# Patient Record
Sex: FEMALE | Race: WHITE | HISPANIC OR LATINO | URBAN - METROPOLITAN AREA
[De-identification: names, ages, dates, MRNs, and addresses within clinical notes are randomized per-mention and may not be internally consistent; named-entity substitution may affect disease eponyms.]

---

## 2017-05-29 ENCOUNTER — EMERGENCY (EMERGENCY)
Facility: HOSPITAL | Age: 32
LOS: 1 days | Discharge: HOME | End: 2017-05-29
Attending: EMERGENCY MEDICINE
Payer: COMMERCIAL

## 2017-05-29 VITALS
HEIGHT: 61 IN | TEMPERATURE: 98 F | RESPIRATION RATE: 18 BRPM | HEART RATE: 75 BPM | OXYGEN SATURATION: 100 % | WEIGHT: 169.98 LBS | DIASTOLIC BLOOD PRESSURE: 78 MMHG | SYSTOLIC BLOOD PRESSURE: 110 MMHG

## 2017-05-29 PROCEDURE — 99283 EMERGENCY DEPT VISIT LOW MDM: CPT

## 2017-05-29 PROCEDURE — 99283 EMERGENCY DEPT VISIT LOW MDM: CPT | Mod: 25

## 2017-05-29 PROCEDURE — 73610 X-RAY EXAM OF ANKLE: CPT

## 2017-05-29 NOTE — ED ADULT TRIAGE NOTE - CHIEF COMPLAINT QUOTE
Pt "was walking on a trail on Saturday and fell on a rock and now I can't walk", no deformity noted to ankle

## 2017-05-29 NOTE — ED STATDOCS - ATTENDING CONTRIBUTION TO CARE
I personally saw the patient with the PA, and completed the key components of the history and physical exam. I then discussed the management plan with the PA I personally saw the patient with the PA, and completed the key components of the history and physical exam. I then discussed the management plan with the PA  30 y/o female presenting c/o left ankle pain s/p mechanical fall 2 days ago. Pt states she has taken Motrin w/o relief, last dose early this morning. bimalleolar ttp and swelling x-ray (-) for fx plan: splint, crutches, RICE, ortho f/u

## 2017-05-29 NOTE — ED STATDOCS - OBJECTIVE STATEMENT
32 y/o female presenting c/o left ankle pain s/p mechanical fall 2 days ago. Pt states she has taken Motrin w/o relief, last dose early this morning. 30 y/o female presenting c/o left ankle pain s/p mechanical fall 2 days ago. Pt states she has taken Motrin w/o relief, last dose early this morning. Pt states she did not seek medical tx until today because she thought it would resolve. No further complaints at this time.

## 2017-05-29 NOTE — ED STATDOCS - NS ED MD SCRIBE ATTENDING SCRIBE SECTIONS
DISPOSITION/HIV/PAST MEDICAL/SURGICAL/SOCIAL HISTORY/REVIEW OF SYSTEMS/PHYSICAL EXAM/VITAL SIGNS( Pullset)/HISTORY OF PRESENT ILLNESS

## 2017-05-30 PROCEDURE — 73610 X-RAY EXAM OF ANKLE: CPT | Mod: 26,LT

## 2017-05-30 RX ORDER — OXYCODONE HYDROCHLORIDE 5 MG/1
1 TABLET ORAL
Qty: 10 | Refills: 0 | OUTPATIENT
Start: 2017-05-30

## 2017-05-31 PROBLEM — Z00.00 ENCOUNTER FOR PREVENTIVE HEALTH EXAMINATION: Status: ACTIVE | Noted: 2017-05-31

## 2017-06-08 ENCOUNTER — APPOINTMENT (OUTPATIENT)
Dept: ORTHOPEDIC SURGERY | Facility: CLINIC | Age: 32
End: 2017-06-08

## 2018-09-06 ENCOUNTER — APPOINTMENT (OUTPATIENT)
Dept: ORTHOPEDIC SURGERY | Facility: CLINIC | Age: 33
End: 2018-09-06
Payer: COMMERCIAL

## 2018-09-06 VITALS
BODY MASS INDEX: 33.99 KG/M2 | HEIGHT: 61 IN | SYSTOLIC BLOOD PRESSURE: 114 MMHG | WEIGHT: 180 LBS | DIASTOLIC BLOOD PRESSURE: 77 MMHG | HEART RATE: 64 BPM

## 2018-09-06 DIAGNOSIS — M54.6 PAIN IN THORACIC SPINE: ICD-10-CM

## 2018-09-06 DIAGNOSIS — Z82.61 FAMILY HISTORY OF ARTHRITIS: ICD-10-CM

## 2018-09-06 DIAGNOSIS — Z80.9 FAMILY HISTORY OF MALIGNANT NEOPLASM, UNSPECIFIED: ICD-10-CM

## 2018-09-06 DIAGNOSIS — Z78.9 OTHER SPECIFIED HEALTH STATUS: ICD-10-CM

## 2018-09-06 DIAGNOSIS — M54.12 RADICULOPATHY, CERVICAL REGION: ICD-10-CM

## 2018-09-06 PROCEDURE — 72040 X-RAY EXAM NECK SPINE 2-3 VW: CPT

## 2018-09-06 PROCEDURE — 99204 OFFICE O/P NEW MOD 45 MIN: CPT

## 2018-09-19 ENCOUNTER — APPOINTMENT (OUTPATIENT)
Dept: ORTHOPEDIC SURGERY | Facility: CLINIC | Age: 33
End: 2018-09-19
Payer: COMMERCIAL

## 2018-09-19 VITALS
DIASTOLIC BLOOD PRESSURE: 79 MMHG | WEIGHT: 180 LBS | HEIGHT: 61 IN | BODY MASS INDEX: 33.99 KG/M2 | HEART RATE: 63 BPM | SYSTOLIC BLOOD PRESSURE: 118 MMHG

## 2018-09-19 DIAGNOSIS — M67.912 UNSPECIFIED DISORDER OF SYNOVIUM AND TENDON, LEFT SHOULDER: ICD-10-CM

## 2018-09-19 DIAGNOSIS — M50.90 CERVICAL DISC DISORDER, UNSPECIFIED, UNSPECIFIED CERVICAL REGION: ICD-10-CM

## 2018-09-19 PROCEDURE — 99214 OFFICE O/P EST MOD 30 MIN: CPT

## 2018-10-29 ENCOUNTER — APPOINTMENT (OUTPATIENT)
Dept: ORTHOPEDIC SURGERY | Facility: CLINIC | Age: 33
End: 2018-10-29
Payer: COMMERCIAL

## 2018-10-29 VITALS
WEIGHT: 185 LBS | DIASTOLIC BLOOD PRESSURE: 70 MMHG | BODY MASS INDEX: 34.93 KG/M2 | SYSTOLIC BLOOD PRESSURE: 107 MMHG | HEART RATE: 82 BPM | HEIGHT: 61 IN

## 2018-10-29 DIAGNOSIS — G25.89 OTHER SPECIFIED EXTRAPYRAMIDAL AND MOVEMENT DISORDERS: ICD-10-CM

## 2018-10-29 DIAGNOSIS — M75.82 OTHER SHOULDER LESIONS, LEFT SHOULDER: ICD-10-CM

## 2018-10-29 DIAGNOSIS — S29.012A STRAIN OF MUSCLE AND TENDON OF BACK WALL OF THORAX, INITIAL ENCOUNTER: ICD-10-CM

## 2018-10-29 DIAGNOSIS — Z80.9 FAMILY HISTORY OF MALIGNANT NEOPLASM, UNSPECIFIED: ICD-10-CM

## 2018-10-29 PROCEDURE — 73030 X-RAY EXAM OF SHOULDER: CPT | Mod: LT

## 2018-10-29 PROCEDURE — 99215 OFFICE O/P EST HI 40 MIN: CPT

## 2018-11-28 ENCOUNTER — FORM ENCOUNTER (OUTPATIENT)
Age: 33
End: 2018-11-28

## 2018-11-29 ENCOUNTER — APPOINTMENT (OUTPATIENT)
Dept: MRI IMAGING | Facility: CLINIC | Age: 33
End: 2018-11-29
Payer: COMMERCIAL

## 2018-11-29 ENCOUNTER — OUTPATIENT (OUTPATIENT)
Dept: OUTPATIENT SERVICES | Facility: HOSPITAL | Age: 33
LOS: 1 days | End: 2018-11-29
Payer: COMMERCIAL

## 2018-11-29 DIAGNOSIS — M67.912 UNSPECIFIED DISORDER OF SYNOVIUM AND TENDON, LEFT SHOULDER: ICD-10-CM

## 2018-11-29 PROCEDURE — 73221 MRI JOINT UPR EXTREM W/O DYE: CPT | Mod: 26,LT

## 2018-11-29 PROCEDURE — 73221 MRI JOINT UPR EXTREM W/O DYE: CPT

## 2019-02-05 ENCOUNTER — APPOINTMENT (OUTPATIENT)
Dept: ORTHOPEDIC SURGERY | Facility: CLINIC | Age: 34
End: 2019-02-05

## 2019-07-17 ENCOUNTER — LABORATORY RESULT (OUTPATIENT)
Age: 34
End: 2019-07-17

## 2019-07-17 ENCOUNTER — APPOINTMENT (OUTPATIENT)
Dept: FAMILY MEDICINE | Facility: CLINIC | Age: 34
End: 2019-07-17
Payer: COMMERCIAL

## 2019-07-17 VITALS
HEIGHT: 61 IN | BODY MASS INDEX: 34.93 KG/M2 | SYSTOLIC BLOOD PRESSURE: 118 MMHG | OXYGEN SATURATION: 98 % | DIASTOLIC BLOOD PRESSURE: 74 MMHG | WEIGHT: 185 LBS | RESPIRATION RATE: 13 BRPM | HEART RATE: 74 BPM

## 2019-07-17 DIAGNOSIS — Z86.39 PERSONAL HISTORY OF OTHER ENDOCRINE, NUTRITIONAL AND METABOLIC DISEASE: ICD-10-CM

## 2019-07-17 PROCEDURE — 36415 COLL VENOUS BLD VENIPUNCTURE: CPT

## 2019-07-17 PROCEDURE — 99203 OFFICE O/P NEW LOW 30 MIN: CPT | Mod: 25

## 2019-07-17 NOTE — HEALTH RISK ASSESSMENT
[] : No [No] : In the past 12 months have you used drugs other than those required for medical reasons? No [No falls in past year] : Patient reported no falls in the past year [0] : 2) Feeling down, depressed, or hopeless: Not at all (0) [[id number]] : 0

## 2019-07-17 NOTE — REVIEW OF SYSTEMS
[Constipation] : constipation [Hair Changes] : hair changes [Negative] : Heme/Lymph [de-identified] : mood swings

## 2019-07-17 NOTE — HISTORY OF PRESENT ILLNESS
[FreeTextEntry8] : 34 yr F ,new to us, here for Hypo thyroid TX.\par Pt states she has never been on a proper dose, meds have been changed frequently.Patient states she goes up and down in her weight, mood swings, constipation, hair loss.\par She is single,  one para one, nonsmoker, no alcohol and no drugs, works in medical billing.

## 2019-07-17 NOTE — ASSESSMENT
[FreeTextEntry1] : Hypothyroid-----patient states she is taking levothyroxine 150 mcg daily. Her dose is always being changed.\par Blood work today for TSH with T3 and T4.\par Antithyroid antibodies.\par CBC and CMP.\par \par Patient will be called with the results and followup initiated appropriatly.

## 2019-07-18 LAB
BASOPHILS # BLD AUTO: 0.04 K/UL
BASOPHILS NFR BLD AUTO: 0.5 %
EOSINOPHIL # BLD AUTO: 0.43 K/UL
EOSINOPHIL NFR BLD AUTO: 5.8 %
HCT VFR BLD CALC: 39.2 %
HGB BLD-MCNC: 11.8 G/DL
IMM GRANULOCYTES NFR BLD AUTO: 0.3 %
LYMPHOCYTES # BLD AUTO: 2.11 K/UL
LYMPHOCYTES NFR BLD AUTO: 28.2 %
MAN DIFF?: NORMAL
MCHC RBC-ENTMCNC: 24.5 PG
MCHC RBC-ENTMCNC: 30.1 GM/DL
MCV RBC AUTO: 81.5 FL
MONOCYTES # BLD AUTO: 0.71 K/UL
MONOCYTES NFR BLD AUTO: 9.5 %
NEUTROPHILS # BLD AUTO: 4.16 K/UL
NEUTROPHILS NFR BLD AUTO: 55.7 %
PLATELET # BLD AUTO: 324 K/UL
RBC # BLD: 4.81 M/UL
RBC # FLD: 16 %
T3 SERPL-MCNC: 95 NG/DL
TSH SERPL-ACNC: 0.07 UIU/ML
WBC # FLD AUTO: 7.47 K/UL

## 2019-07-19 ENCOUNTER — RX RENEWAL (OUTPATIENT)
Age: 34
End: 2019-07-19

## 2019-07-19 ENCOUNTER — RESULT REVIEW (OUTPATIENT)
Age: 34
End: 2019-07-19

## 2019-07-19 LAB
ALBUMIN SERPL ELPH-MCNC: 4.3 G/DL
ALP BLD-CCNC: 90 U/L
ALT SERPL-CCNC: 17 U/L
ANION GAP SERPL CALC-SCNC: 17 MMOL/L
AST SERPL-CCNC: 18 U/L
BILIRUB SERPL-MCNC: <0.2 MG/DL
BUN SERPL-MCNC: 12 MG/DL
CALCIUM SERPL-MCNC: 9.7 MG/DL
CHLORIDE SERPL-SCNC: 103 MMOL/L
CO2 SERPL-SCNC: 24 MMOL/L
CREAT SERPL-MCNC: 0.66 MG/DL
GLUCOSE SERPL-MCNC: 50 MG/DL
POTASSIUM SERPL-SCNC: 4.3 MMOL/L
PROT SERPL-MCNC: 7.4 G/DL
SODIUM SERPL-SCNC: 144 MMOL/L
THYROGLOB AB SERPL-ACNC: <20 IU/ML
THYROPEROXIDASE AB SERPL IA-ACNC: 75.5 IU/ML

## 2019-12-26 ENCOUNTER — APPOINTMENT (OUTPATIENT)
Dept: FAMILY MEDICINE | Facility: CLINIC | Age: 34
End: 2019-12-26

## 2020-01-09 ENCOUNTER — APPOINTMENT (OUTPATIENT)
Dept: NEUROLOGY | Facility: CLINIC | Age: 35
End: 2020-01-09

## 2020-03-03 ENCOUNTER — APPOINTMENT (OUTPATIENT)
Dept: NEUROLOGY | Facility: CLINIC | Age: 35
End: 2020-03-03

## 2021-10-21 ENCOUNTER — APPOINTMENT (OUTPATIENT)
Dept: UROLOGY | Facility: CLINIC | Age: 36
End: 2021-10-21
Payer: COMMERCIAL

## 2021-10-21 ENCOUNTER — NON-APPOINTMENT (OUTPATIENT)
Age: 36
End: 2021-10-21

## 2021-10-21 VITALS
BODY MASS INDEX: 35.87 KG/M2 | HEART RATE: 85 BPM | WEIGHT: 190 LBS | HEIGHT: 61 IN | TEMPERATURE: 98 F | SYSTOLIC BLOOD PRESSURE: 94 MMHG | DIASTOLIC BLOOD PRESSURE: 61 MMHG

## 2021-10-21 DIAGNOSIS — N28.1 CYST OF KIDNEY, ACQUIRED: ICD-10-CM

## 2021-10-21 DIAGNOSIS — N32.81 OVERACTIVE BLADDER: ICD-10-CM

## 2021-10-21 LAB
BILIRUB UR QL STRIP: NEGATIVE
CLARITY UR: CLEAR
COLLECTION METHOD: NORMAL
GLUCOSE UR-MCNC: NEGATIVE
HCG UR QL: 0.2 EU/DL
HGB UR QL STRIP.AUTO: NEGATIVE
KETONES UR-MCNC: NEGATIVE
LEUKOCYTE ESTERASE UR QL STRIP: NEGATIVE
NITRITE UR QL STRIP: NEGATIVE
PH UR STRIP: 6
PROT UR STRIP-MCNC: NEGATIVE
SP GR UR STRIP: 1.02

## 2021-10-21 PROCEDURE — 51798 US URINE CAPACITY MEASURE: CPT

## 2021-10-21 PROCEDURE — 99204 OFFICE O/P NEW MOD 45 MIN: CPT

## 2021-10-21 RX ORDER — LEVOTHYROXINE SODIUM 137 UG/1
137 TABLET ORAL
Qty: 30 | Refills: 2 | Status: COMPLETED | COMMUNITY
End: 2021-10-21

## 2021-10-21 RX ORDER — OXYBUTYNIN CHLORIDE 5 MG/1
5 TABLET, EXTENDED RELEASE ORAL
Qty: 30 | Refills: 1 | Status: ACTIVE | COMMUNITY
Start: 2021-10-21 | End: 1900-01-01

## 2021-10-21 RX ORDER — NORETHINDRONE 0.35 MG/1
TABLET ORAL
Refills: 0 | Status: ACTIVE | COMMUNITY

## 2021-10-21 RX ORDER — METHYLPREDNISOLONE 4 MG/1
4 TABLET ORAL
Qty: 1 | Refills: 0 | Status: COMPLETED | COMMUNITY
Start: 2018-10-29 | End: 2021-10-21

## 2021-10-21 RX ORDER — LEVOTHYROXINE SODIUM 112 UG/1
112 TABLET ORAL
Refills: 0 | Status: ACTIVE | COMMUNITY

## 2021-10-21 NOTE — HISTORY OF PRESENT ILLNESS
[FreeTextEntry1] : Ms. BHARGAVI OLIVA 36 year old  F  PMH hypothyroidism and no PSH. Pt come sin sent by PCP for renal cyst. 8/6/21 Renal US shows a 2.8cm lower pole left renal parapelvic cyst. PVR 8. Pt sometimes feels she doesn't totally empty. Nocturia x2. Pt urinates about every 2 hours. \par \par PVR 0

## 2021-10-21 NOTE — REVIEW OF SYSTEMS
[Incontinence] : incontinence [Wake up at night to urinate  How many times?  ___] : wakes up to urinate [unfilled] times during the night [Negative] : Heme/Lymph [see HPI] : see HPI [FreeTextEntry6] : has sensation to use the bathroom all the time, even after urinating

## 2021-10-21 NOTE — LETTER BODY
[Dear  ___] : Dear  [unfilled], [Consult Letter:] : I had the pleasure of evaluating your patient, [unfilled]. [Please see my note below.] : Please see my note below. [Consult Closing:] : Thank you very much for allowing me to participate in the care of this patient.  If you have any questions, please do not hesitate to contact me. [Sincerely,] : Sincerely, [FreeTextEntry3] : Paul Gross, DO\par Genitourinary Medicine\par

## 2021-10-25 LAB
APPEARANCE: CLEAR
BACTERIA UR CULT: NORMAL
BACTERIA: NEGATIVE
BILIRUBIN URINE: NEGATIVE
BLOOD URINE: NEGATIVE
COLOR: NORMAL
GLUCOSE QUALITATIVE U: NEGATIVE
HYALINE CASTS: 0 /LPF
KETONES URINE: NEGATIVE
LEUKOCYTE ESTERASE URINE: NEGATIVE
MICROSCOPIC-UA: NORMAL
NITRITE URINE: NEGATIVE
PH URINE: 6
PROTEIN URINE: NEGATIVE
RED BLOOD CELLS URINE: 0 /HPF
SPECIFIC GRAVITY URINE: 1.02
SQUAMOUS EPITHELIAL CELLS: 2 /HPF
UROBILINOGEN URINE: NORMAL
WHITE BLOOD CELLS URINE: 2 /HPF

## 2021-11-18 ENCOUNTER — APPOINTMENT (OUTPATIENT)
Dept: UROLOGY | Facility: CLINIC | Age: 36
End: 2021-11-18

## 2022-02-08 ENCOUNTER — APPOINTMENT (OUTPATIENT)
Dept: PLASTIC SURGERY | Facility: CLINIC | Age: 37
End: 2022-02-08
Payer: COMMERCIAL

## 2022-02-08 VITALS
DIASTOLIC BLOOD PRESSURE: 67 MMHG | OXYGEN SATURATION: 97 % | SYSTOLIC BLOOD PRESSURE: 103 MMHG | HEART RATE: 82 BPM | RESPIRATION RATE: 14 BRPM | TEMPERATURE: 97.5 F | HEIGHT: 61 IN | WEIGHT: 207 LBS | BODY MASS INDEX: 39.08 KG/M2

## 2022-02-08 DIAGNOSIS — R22.32 LOCALIZED SWELLING, MASS AND LUMP, LEFT UPPER LIMB: ICD-10-CM

## 2022-02-08 DIAGNOSIS — E07.9 DISORDER OF THYROID, UNSPECIFIED: ICD-10-CM

## 2022-02-08 PROCEDURE — 99203 OFFICE O/P NEW LOW 30 MIN: CPT

## 2022-02-08 RX ORDER — LEVOTHYROXINE SODIUM 150 UG/1
150 TABLET ORAL
Qty: 90 | Refills: 0 | Status: ACTIVE | COMMUNITY
Start: 2021-11-05

## 2022-02-08 RX ORDER — PEN NEEDLE, DIABETIC 32GX 5/32"
32G X 4 MM NEEDLE, DISPOSABLE MISCELLANEOUS
Qty: 100 | Refills: 0 | Status: ACTIVE | COMMUNITY
Start: 2021-12-02

## 2022-02-08 RX ORDER — LIRAGLUTIDE 6 MG/ML
18 INJECTION, SOLUTION SUBCUTANEOUS
Qty: 15 | Refills: 0 | Status: ACTIVE | COMMUNITY
Start: 2021-11-16

## 2022-03-02 NOTE — HISTORY OF PRESENT ILLNESS
[FreeTextEntry1] : Ms. BHARGAVI OLIVA is a 36 year old female with past medical history of thyroid condition who presents to discuss a mass in her left axilla which she would like removed.  Since first noticing the mass she states it has been slowly growing and becoming increasingly more painful.  She is also worried about its etiology\par \par non smoker\par no anticoagulation or bleeding disorders \par

## 2022-03-02 NOTE — PHYSICAL EXAM
[NI] : Normal [de-identified] : firm, poorly circumscribed palpable 5 cm x 2 cm left axillary mass

## 2022-03-02 NOTE — ASSESSMENT
[FreeTextEntry1] : pt to be sent for ultrasound to further evaluate the mass and follow up once completed to discuss options

## 2023-12-21 NOTE — ED STATDOCS - [facility] ED SYMPTOM POS
From: Sayda SPANN  To: Jimbo Liu  Sent: 12/11/2023 11:23 AM CST  Subject: Ozempic    Ernst Cohi,   Unfortunately your insurance is denying the ozempic at this time, they require you to try and fail 2 medications, metformin and insulin first. We have sent a message to Dr. García for his recommendations on the next step.   Sayda PAGE   PAIN